# Patient Record
Sex: FEMALE | Race: WHITE | NOT HISPANIC OR LATINO | Employment: UNEMPLOYED | URBAN - METROPOLITAN AREA
[De-identification: names, ages, dates, MRNs, and addresses within clinical notes are randomized per-mention and may not be internally consistent; named-entity substitution may affect disease eponyms.]

---

## 2019-07-30 ENCOUNTER — OFFICE VISIT (OUTPATIENT)
Dept: URGENT CARE | Age: 64
End: 2019-07-30
Payer: COMMERCIAL

## 2019-07-30 VITALS
HEIGHT: 60 IN | RESPIRATION RATE: 20 BRPM | DIASTOLIC BLOOD PRESSURE: 65 MMHG | TEMPERATURE: 99.2 F | OXYGEN SATURATION: 96 % | BODY MASS INDEX: 25.6 KG/M2 | HEART RATE: 87 BPM | WEIGHT: 130.4 LBS | SYSTOLIC BLOOD PRESSURE: 146 MMHG

## 2019-07-30 DIAGNOSIS — K62.89 RECTAL PAIN: ICD-10-CM

## 2019-07-30 DIAGNOSIS — K59.00 CONSTIPATION, UNSPECIFIED CONSTIPATION TYPE: Primary | ICD-10-CM

## 2019-07-30 PROCEDURE — G0463 HOSPITAL OUTPT CLINIC VISIT: HCPCS | Performed by: FAMILY MEDICINE

## 2019-07-30 PROCEDURE — 99203 OFFICE O/P NEW LOW 30 MIN: CPT | Performed by: FAMILY MEDICINE

## 2019-07-30 RX ORDER — TOPIRAMATE 50 MG/1
TABLET, FILM COATED ORAL
COMMUNITY
Start: 2019-01-03

## 2019-07-30 RX ORDER — OXYBUTYNIN CHLORIDE 15 MG/1
15 TABLET, EXTENDED RELEASE ORAL
COMMUNITY

## 2019-07-30 RX ORDER — ALBUTEROL SULFATE 0.63 MG/3ML
1 SOLUTION RESPIRATORY (INHALATION) 2 TIMES DAILY PRN
COMMUNITY

## 2019-07-30 RX ORDER — TRAMADOL HYDROCHLORIDE 50 MG/1
TABLET ORAL
COMMUNITY
Start: 2019-07-10

## 2019-07-30 RX ORDER — TOLTERODINE 2 MG/1
2 CAPSULE, EXTENDED RELEASE ORAL
COMMUNITY

## 2019-07-30 RX ORDER — REPAGLINIDE 1 MG/1
TABLET ORAL
COMMUNITY
Start: 2019-07-07

## 2019-07-30 RX ORDER — BLOOD-GLUCOSE METER
EACH MISCELLANEOUS
COMMUNITY
Start: 2019-06-07

## 2019-07-30 RX ORDER — DULOXETIN HYDROCHLORIDE 60 MG/1
CAPSULE, DELAYED RELEASE ORAL
COMMUNITY
Start: 2019-07-07

## 2019-07-30 RX ORDER — POLYETHYLENE GLYCOL 3350 17 G/17G
17 POWDER, FOR SOLUTION ORAL DAILY
Qty: 14 EACH | Refills: 0 | Status: SHIPPED | OUTPATIENT
Start: 2019-07-30

## 2019-07-30 RX ORDER — ALBUTEROL SULFATE 2.5 MG/3ML
SOLUTION RESPIRATORY (INHALATION)
COMMUNITY
Start: 2019-06-04

## 2019-07-30 RX ORDER — IBUPROFEN 600 MG/1
600 TABLET ORAL EVERY 6 HOURS PRN
COMMUNITY
Start: 2019-04-11 | End: 2020-04-10

## 2019-07-30 RX ORDER — LORATADINE 10 MG/1
10 TABLET ORAL
COMMUNITY

## 2019-07-30 RX ORDER — REPAGLINIDE 1 MG/1
TABLET ORAL
COMMUNITY
Start: 2019-07-11

## 2019-07-30 RX ORDER — ATORVASTATIN CALCIUM 10 MG/1
TABLET, FILM COATED ORAL
COMMUNITY
Start: 2019-07-28

## 2019-07-30 RX ORDER — GABAPENTIN 600 MG/1
600 TABLET ORAL 3 TIMES DAILY
COMMUNITY
Start: 2019-05-28

## 2019-07-30 RX ORDER — OMEPRAZOLE 40 MG/1
CAPSULE, DELAYED RELEASE ORAL
COMMUNITY
Start: 2019-07-07

## 2019-07-30 RX ORDER — ATORVASTATIN CALCIUM 20 MG/1
20 TABLET, FILM COATED ORAL
COMMUNITY
Start: 2019-07-11 | End: 2020-07-10

## 2019-07-30 RX ORDER — ATORVASTATIN CALCIUM 20 MG/1
TABLET, FILM COATED ORAL
COMMUNITY
Start: 2019-07-11

## 2019-07-30 RX ORDER — GABAPENTIN 600 MG/1
TABLET ORAL
COMMUNITY
Start: 2019-06-07

## 2019-07-30 RX ORDER — FEXOFENADINE HCL 180 MG/1
TABLET ORAL
COMMUNITY

## 2019-07-30 RX ORDER — BACLOFEN 10 MG/1
TABLET ORAL
COMMUNITY
Start: 2019-06-06

## 2019-07-30 NOTE — PROGRESS NOTES
[employer]        NAME: Damaris Morel is a 62 y o  female  : 1955    MRN: [id number]  DATE: 2019  TIME: 11:57 AM    Assessment and Plan   Constipation, unspecified constipation type [K59 00]  1  Constipation, unspecified constipation type  polyethylene glycol (MIRALAX) 17 g packet   2  Rectal pain  Lidocaine-Glycerin (PREPARATION H) 5-14 4 % CREA       Patient Instructions     Apply preparation H as directed  MiraLax as directed  Increase water and fiber intake  Follow up with PCP/GI in 3-5 days  Proceed to  ER if symptoms worsen  Chief Complaint     Chief Complaint   Patient presents with    Rectal Pain     Pt reports she may have a tear between her rectum and vagina  C/O burning and pain when wiping with blood  Has been applying OTC cream  Ongoing for several weeks  States she has constipation and has been straining with BMs  History of Present Illness       Patient is a 67-year-old female who presents with intermittent rectal pain x several weeks  She reports history of chronic constipation  Has had 2 bowel movements in 2 weeks  Last colonoscopy was 4-5 years ago at which time she had removal of 2 polyps  Per patient she is supposed to follow up with GI for another colonoscopy right around this time  According to patient, she strains a lot while trying to have a BM and now has a lot of pain in the rectal area  Denies any bleeding or abdominal pain  Patient admitted to not eating much fiber or drinking a lot of water  Review of Systems   Review of Systems   Constitutional: Negative for fever  Respiratory: Negative  Cardiovascular: Negative  Gastrointestinal: Positive for constipation and rectal pain  Genitourinary: Negative  Negative for dysuria and vaginal discharge  Musculoskeletal: Negative  Skin: Negative for rash  Neurological: Negative            Current Medications       Current Outpatient Medications:     albuterol (2 5 mg/3 mL) 0 083 % nebulizer solution, , Disp: , Rfl:     albuterol (ACCUNEB) 0 63 MG/3ML nebulizer solution, Inhale 1 ampule 2 (two) times a day as needed, Disp: , Rfl:     ASPIRIN 81 PO, Take 81 mg by mouth daily, Disp: , Rfl:     atorvastatin (LIPITOR) 10 mg tablet, , Disp: , Rfl:     atorvastatin (LIPITOR) 20 mg tablet, Take 20 mg by mouth, Disp: , Rfl:     atorvastatin (LIPITOR) 20 mg tablet, , Disp: , Rfl:     baclofen 10 mg tablet, , Disp: , Rfl:     Blood Glucose Monitoring Suppl (ACCU-CHEK SALVADOR PLUS) w/Device KIT, , Disp: , Rfl:     Budesonide-Formoterol Fumarate (SYMBICORT IN), Inhale 1 Dose 2 (two) times a day, Disp: , Rfl:     DULoxetine (CYMBALTA) 60 mg delayed release capsule, , Disp: , Rfl:     fexofenadine (ALLEGRA) 180 MG tablet, Take by mouth, Disp: , Rfl:     gabapentin (NEURONTIN) 600 MG tablet, Take 600 mg by mouth Three times a day, Disp: , Rfl:     hydrocortisone 2 5 % cream, apply to affected area twice a day AS NEEDED, Disp: , Rfl:     ibuprofen (MOTRIN) 600 mg tablet, Take 600 mg by mouth every 6 (six) hours as needed, Disp: , Rfl:     insulin glargine (LANTUS SOLOSTAR) 100 units/mL injection pen, Indications: type 2 diabetes mellitus   Inject 5 units daily, Disp: , Rfl:     loratadine (CLARITIN) 10 mg tablet, Take 10 mg by mouth, Disp: , Rfl:     metFORMIN (GLUCOPHAGE) 1000 MG tablet, Take 1,000 mg by mouth, Disp: , Rfl:     metFORMIN (GLUCOPHAGE) 1000 MG tablet, , Disp: , Rfl:     omeprazole (PriLOSEC) 40 MG capsule, , Disp: , Rfl:     oxybutynin (DITROPAN XL) 15 MG 24 hr tablet, Take 15 mg by mouth, Disp: , Rfl:     repaglinide (PRANDIN) 1 mg tablet, Take 2 tablets with lunch and 1 tablet with light evening meal , Disp: , Rfl:     repaglinide (PRANDIN) 1 mg tablet, , Disp: , Rfl:     tolterodine (DETROL LA) 2 mg 24 hr capsule, Take 2 mg by mouth, Disp: , Rfl:     topiramate (TOPAMAX) 50 MG tablet, take 1 tablet by mouth twice a day, Disp: , Rfl:     traMADol (ULTRAM) 50 mg tablet, , Disp: , Rfl:     gabapentin (NEURONTIN) 600 MG tablet, , Disp: , Rfl:     Lidocaine-Glycerin (PREPARATION H) 5-14 4 % CREA, Apply prn up to 4X/day, Disp: 1 Tube, Rfl: 0    polyethylene glycol (MIRALAX) 17 g packet, Take 17 g by mouth daily, Disp: 14 each, Rfl: 0    Current Allergies     Allergies as of 07/30/2019 - Reviewed 07/30/2019   Allergen Reaction Noted    Lanolin  04/20/2018    Pollen extract  04/20/2018    Strawberry extract Itching 04/20/2018            The following portions of the patient's history were reviewed and updated as appropriate: allergies, current medications, past family history, past medical history, past social history, past surgical history and problem list      Past Medical History:   Diagnosis Date    Arthritis     Diabetes mellitus ([address] )     Fibromyalgia     GERD (gastroesophageal reflux disease)     Hypercholesterolemia        Past Surgical History:   Procedure Laterality Date    CARPAL TUNNEL RELEASE      TUBAL LIGATION         History reviewed  No pertinent family history  Medications have been verified  Objective   /65   Pulse 87   Temp 99 2 °F (37 3 °C)   Resp 20   Ht 4' 11 5" (1 511 m)   Wt 59 1 kg (130 lb 6 4 oz)   SpO2 96%   BMI 25 90 kg/m²        Physical Exam     Physical Exam   Constitutional: She is oriented to person, place, and time  She appears well-developed and well-nourished  No distress  HENT:   Head: Normocephalic and atraumatic  Eyes: Conjunctivae and EOM are normal    Neck: Normal range of motion  Neck supple  Cardiovascular: Normal rate  Pulmonary/Chest: Effort normal and breath sounds normal  No respiratory distress  She has no wheezes  She has no rales  She exhibits no tenderness  Abdominal: Soft  Bowel sounds are normal  She exhibits no distension and no mass  There is no tenderness  There is no rebound and no guarding     Genitourinary:   Genitourinary Comments: Multiple small outpouching of the rectal mucosa with mild erythema, area tender to palpation but no edema noted  Unable to appreciate any anal fissures  Musculoskeletal: Normal range of motion  She exhibits no edema, tenderness or deformity  Lymphadenopathy:     She has no cervical adenopathy  Neurological: She is alert and oriented to person, place, and time  Skin: Skin is warm and dry  No rash noted  No erythema  No pallor  Psychiatric: She has a normal mood and affect  Nursing note and vitals reviewed

## 2019-08-14 ENCOUNTER — OFFICE VISIT (OUTPATIENT)
Dept: URGENT CARE | Age: 64
End: 2019-08-14
Payer: COMMERCIAL

## 2019-08-14 VITALS
BODY MASS INDEX: 25.13 KG/M2 | WEIGHT: 128 LBS | SYSTOLIC BLOOD PRESSURE: 154 MMHG | TEMPERATURE: 97.6 F | OXYGEN SATURATION: 98 % | HEIGHT: 60 IN | RESPIRATION RATE: 18 BRPM | DIASTOLIC BLOOD PRESSURE: 68 MMHG | HEART RATE: 99 BPM

## 2019-08-14 DIAGNOSIS — S69.91XA INJURY OF RIGHT THUMB, INITIAL ENCOUNTER: Primary | ICD-10-CM

## 2019-08-14 DIAGNOSIS — L03.011 PARONYCHIA OF RIGHT THUMB: ICD-10-CM

## 2019-08-14 PROCEDURE — 99213 OFFICE O/P EST LOW 20 MIN: CPT | Performed by: NURSE PRACTITIONER

## 2019-08-14 PROCEDURE — G0463 HOSPITAL OUTPT CLINIC VISIT: HCPCS | Performed by: NURSE PRACTITIONER

## 2019-08-14 RX ORDER — LANCETS
EACH MISCELLANEOUS
Refills: 0 | COMMUNITY
Start: 2019-06-07

## 2019-08-14 RX ORDER — CEPHALEXIN 500 MG/1
500 CAPSULE ORAL EVERY 8 HOURS SCHEDULED
Qty: 21 CAPSULE | Refills: 0 | Status: SHIPPED | OUTPATIENT
Start: 2019-08-14 | End: 2019-08-21

## 2019-08-14 RX ORDER — BLOOD SUGAR DIAGNOSTIC
STRIP MISCELLANEOUS
Refills: 0 | COMMUNITY
Start: 2019-06-07

## 2019-08-14 NOTE — PATIENT INSTRUCTIONS
Take meds as directed  Take antibiotic as directed   Warm soaks with epson salt   If worse go to the ER  Tylenol and motrin for fever or pain       Paronychia   WHAT YOU NEED TO KNOW:   Paronychia is an infection of your nail fold caused by bacteria or a fungus  The nail fold is the skin around your nail  Paronychia may happen suddenly and last for 6 weeks or longer  You may have paronychia on more than 1 finger or toe  DISCHARGE INSTRUCTIONS:   Medicines:   · Td vaccine  is a booster shot used to help prevent tetanus and diphtheria  The Td booster may be given to adolescents and adults every 10 years or for certain wounds and injuries  · Antibiotics: This medicine will help fight or prevent an infection  It may be given as a pill, cream, or ointment  · Steroids: This medicine will help decrease inflammation  It may be given as a pill, cream, or ointment  · Antifungal medicine: This medicine helps kill fungus that may be causing your infection  It may be given as a cream or ointment  · NSAIDs:  These medicines decrease pain and swelling  NSAIDs are available without a doctor's order  Ask your healthcare provider which medicine is right for you  Ask how much to take and when to take it  Take as directed  NSAIDs can cause stomach bleeding and kidney problems if not taken correctly  · Take your medicine as directed  Contact your healthcare provider if you think your medicine is not helping or if you have side effects  Tell him of her if you are allergic to any medicine  Keep a list of the medicines, vitamins, and herbs you take  Include the amounts, and when and why you take them  Bring the list or the pill bottles to follow-up visits  Carry your medicine list with you in case of an emergency  Follow up with your healthcare provider as directed:  Write down your questions so you remember to ask them during your visits     Self-care:   · Soak your nail:  Soak your nail in a mixture of equal parts vinegar and water 3 or 4 times each day  This will help decrease inflammation  · Apply a warm compress:  Soak a washcloth in warm water and place it on your nail  This will help decrease inflammation  · Elevate:  Raise your nail above the level of your heart as often as you can  This will help decrease swelling and pain  Prop your nail on pillows or blankets to keep it elevated comfortably  · Use lotion:  Apply lotion after you wash your hands  This will prevent your skin from becoming too dry  Prevent paronychia:   · Avoid chemicals and allergens that may harm your skin and nails  This includes soaps, laundry detergents, and nail products  · Keep your nails clean and dry  Avoid soaking your nails in water  Use cotton-lined rubber gloves or wear 2 rubber gloves if you work with food or water  The gloves will help protect your nail folds  · Keep your nails short  Do not bite your nails, pick at your hangnails, suck your fingers, or wear fake nails  Bring your own nail tools when you go to the nail salon  Contact your healthcare provider if:   · Your nail becomes loose, deformed, or falls off  · You have a large abscess on your nail  · You have questions or concerns about your condition or care  Return to the emergency department if:   · You have severe nail pain  · The inflammation spreads to your hand or arm  © 2017 [employer] Information is for End User's use only and may not be sold, redistributed or otherwise used for commercial purposes  All illustrations and images included in CareNotes® are the copyrighted property of [employer] M [employer]  or [employer]  The above information is an  only  It is not intended as medical advice for individual conditions or treatments  Talk to your doctor, nurse or pharmacist before following any medical regimen to see if it is safe and effective for you

## 2019-08-14 NOTE — PROGRESS NOTES
NAME: Nnamdi Berkowitz is a 62 y o  female  : 1955    MRN: [id number]      Assessment and Plan   Injury of right thumb, initial encounter Rob Sandoval  1  Injury of right thumb, initial encounter  CANCELED: XR thumb right first digit-min 2v   2  Paronychia of right thumb  cephalexin (KEFLEX) 500 mg capsule       Sanya Marti was seen today for thumb pain  Diagnoses and all orders for this visit:    Injury of right thumb, initial encounter  -     Cancel: XR thumb right first digit-min 2v; Future    Paronychia of right thumb  -     cephalexin (KEFLEX) 500 mg capsule; Take 1 capsule (500 mg total) by mouth every 8 (eight) hours for 7 days        Patient Instructions   Patient Instructions   Take meds as directed  Take antibiotic as directed   Warm soaks with epson salt   If worse go to the ER  Tylenol and motrin for fever or pain       Paronychia   WHAT YOU NEED TO KNOW:   Paronychia is an infection of your nail fold caused by bacteria or a fungus  The nail fold is the skin around your nail  Paronychia may happen suddenly and last for 6 weeks or longer  You may have paronychia on more than 1 finger or toe  DISCHARGE INSTRUCTIONS:   Medicines:   · Td vaccine  is a booster shot used to help prevent tetanus and diphtheria  The Td booster may be given to adolescents and adults every 10 years or for certain wounds and injuries  · Antibiotics: This medicine will help fight or prevent an infection  It may be given as a pill, cream, or ointment  · Steroids: This medicine will help decrease inflammation  It may be given as a pill, cream, or ointment  · Antifungal medicine: This medicine helps kill fungus that may be causing your infection  It may be given as a cream or ointment  · NSAIDs:  These medicines decrease pain and swelling  NSAIDs are available without a doctor's order  Ask your healthcare provider which medicine is right for you  Ask how much to take and when to take it  Take as directed  NSAIDs can cause stomach bleeding and kidney problems if not taken correctly  · Take your medicine as directed  Contact your healthcare provider if you think your medicine is not helping or if you have side effects  Tell him of her if you are allergic to any medicine  Keep a list of the medicines, vitamins, and herbs you take  Include the amounts, and when and why you take them  Bring the list or the pill bottles to follow-up visits  Carry your medicine list with you in case of an emergency  Follow up with your healthcare provider as directed:  Write down your questions so you remember to ask them during your visits  Self-care:   · Soak your nail:  Soak your nail in a mixture of equal parts vinegar and water 3 or 4 times each day  This will help decrease inflammation  · Apply a warm compress:  Soak a washcloth in warm water and place it on your nail  This will help decrease inflammation  · Elevate:  Raise your nail above the level of your heart as often as you can  This will help decrease swelling and pain  Prop your nail on pillows or blankets to keep it elevated comfortably  · Use lotion:  Apply lotion after you wash your hands  This will prevent your skin from becoming too dry  Prevent paronychia:   · Avoid chemicals and allergens that may harm your skin and nails  This includes soaps, laundry detergents, and nail products  · Keep your nails clean and dry  Avoid soaking your nails in water  Use cotton-lined rubber gloves or wear 2 rubber gloves if you work with food or water  The gloves will help protect your nail folds  · Keep your nails short  Do not bite your nails, pick at your hangnails, suck your fingers, or wear fake nails  Bring your own nail tools when you go to the nail salon  Contact your healthcare provider if:   · Your nail becomes loose, deformed, or falls off  · You have a large abscess on your nail      · You have questions or concerns about your condition or care   Return to the emergency department if:   · You have severe nail pain  · The inflammation spreads to your hand or arm  © 2017 [employer] Information is for End User's use only and may not be sold, redistributed or otherwise used for commercial purposes  All illustrations and images included in CareNotes® are the copyrighted property of [employer] Inc  or [employer]  The above information is an  only  It is not intended as medical advice for individual conditions or treatments  Talk to your doctor, nurse or pharmacist before following any medical regimen to see if it is safe and effective for you  Proceed to ER if symptoms worsen  Chief Complaint     Chief Complaint   Patient presents with    Thumb Pain     Pt states she injured her right thumb 3 weeks ago but could not recall how  Pt states the pain intensified about a week ago (sharp)  Pt has redness, swelling, and pus discharge around nailbed  Denies fever  History of Present Illness     18-year-old female here today with right thumb pain and possible infection  She states she injured her right thumb about 3 weeks ago and for got about it and knows that it was swelling since yesterday  She was picking at it and then burned a needle and popped whole in the wound and pus drained from the area  Finger is tender to touch however has full range of motion no fevers and no red streaks coming from the wound  Patient has good capillary refill  Review of Systems   Review of Systems   Skin: Positive for wound (Right thumb)           Current Medications       Current Outpatient Medications:     ACCU-CHEK SALVADOR PLUS test strip, TEST BLOOD SUGARS 3 TIMES DAILY, Disp: , Rfl: 0    ACCU-CHEK SOFTCLIX LANCETS lancets, USE TO TEST BLOOD SUGARS 3 TIMES DAILY, Disp: , Rfl: 0    albuterol (2 5 mg/3 mL) 0 083 % nebulizer solution, , Disp: , Rfl:     albuterol (ACCUNEB) 0 63 MG/3ML nebulizer solution, Inhale 1 ampule 2 (two) times a day as needed, Disp: , Rfl:     ASPIRIN 81 PO, Take 81 mg by mouth daily, Disp: , Rfl:     atorvastatin (LIPITOR) 10 mg tablet, , Disp: , Rfl:     atorvastatin (LIPITOR) 20 mg tablet, Take 20 mg by mouth, Disp: , Rfl:     atorvastatin (LIPITOR) 20 mg tablet, , Disp: , Rfl:     baclofen 10 mg tablet, , Disp: , Rfl:     Blood Glucose Monitoring Suppl (ACCU-CHEK SALVADOR PLUS) w/Device KIT, , Disp: , Rfl:     Budesonide-Formoterol Fumarate (SYMBICORT IN), Inhale 1 Dose 2 (two) times a day, Disp: , Rfl:     cephalexin (KEFLEX) 500 mg capsule, Take 1 capsule (500 mg total) by mouth every 8 (eight) hours for 7 days, Disp: 21 capsule, Rfl: 0    DULoxetine (CYMBALTA) 60 mg delayed release capsule, , Disp: , Rfl:     fexofenadine (ALLEGRA) 180 MG tablet, Take by mouth, Disp: , Rfl:     gabapentin (NEURONTIN) 600 MG tablet, Take 600 mg by mouth Three times a day, Disp: , Rfl:     gabapentin (NEURONTIN) 600 MG tablet, , Disp: , Rfl:     hydrocortisone 2 5 % cream, apply to affected area twice a day AS NEEDED, Disp: , Rfl:     ibuprofen (MOTRIN) 600 mg tablet, Take 600 mg by mouth every 6 (six) hours as needed, Disp: , Rfl:     insulin glargine (LANTUS SOLOSTAR) 100 units/mL injection pen, Indications: type 2 diabetes mellitus   Inject 5 units daily, Disp: , Rfl:     Lidocaine-Glycerin (PREPARATION H) 5-14 4 % CREA, Apply prn up to 4X/day, Disp: 1 Tube, Rfl: 0    loratadine (CLARITIN) 10 mg tablet, Take 10 mg by mouth, Disp: , Rfl:     metFORMIN (GLUCOPHAGE) 1000 MG tablet, Take 1,000 mg by mouth, Disp: , Rfl:     metFORMIN (GLUCOPHAGE) 1000 MG tablet, , Disp: , Rfl:     omeprazole (PriLOSEC) 40 MG capsule, , Disp: , Rfl:     oxybutynin (DITROPAN XL) 15 MG 24 hr tablet, Take 15 mg by mouth, Disp: , Rfl:     polyethylene glycol (MIRALAX) 17 g packet, Take 17 g by mouth daily, Disp: 14 each, Rfl: 0    repaglinide (PRANDIN) 1 mg tablet, Take 2 tablets with lunch and 1 tablet with light evening meal , Disp: , Rfl:     repaglinide (PRANDIN) 1 mg tablet, , Disp: , Rfl:     tolterodine (DETROL LA) 2 mg 24 hr capsule, Take 2 mg by mouth, Disp: , Rfl:     topiramate (TOPAMAX) 50 MG tablet, take 1 tablet by mouth twice a day, Disp: , Rfl:     traMADol (ULTRAM) 50 mg tablet, , Disp: , Rfl:     Current Allergies     Allergies as of 08/14/2019 - Reviewed 08/14/2019   Allergen Reaction Noted    Lanolin  04/20/2018    Pollen extract  04/20/2018    Strawberry extract Itching 04/20/2018              Past Medical History:   Diagnosis Date    Arthritis     Diabetes mellitus ([address] )     Fibromyalgia     GERD (gastroesophageal reflux disease)     Hypercholesterolemia        Past Surgical History:   Procedure Laterality Date    CARPAL TUNNEL RELEASE      TUBAL LIGATION         History reviewed  No pertinent family history  Medications have been verified  The following portions of the patient's history were reviewed and updated as appropriate: allergies, current medications, past family history, past medical history, past social history, past surgical history and problem list     Objective   /68   Pulse 99   Temp 97 6 °F (36 4 °C)   Resp 18   Ht 4' 11 5" (1 511 m)   Wt 58 1 kg (128 lb)   SpO2 98%   BMI 25 42 kg/m²      Physical Exam     Physical Exam   Constitutional: She appears well-developed and well-nourished  Cardiovascular: Normal rate, regular rhythm and normal heart sounds  Pulmonary/Chest: Effort normal and breath sounds normal    Musculoskeletal:        Right hand: She exhibits tenderness  She exhibits normal range of motion and no swelling  Normal sensation noted  Normal strength noted  She exhibits no finger abduction, no thumb/finger opposition and no wrist extension trouble  Hands:  Skin: Skin is warm  Capillary refill takes less than 2 seconds         DEO Durant

## 2023-05-02 ENCOUNTER — OFFICE VISIT (OUTPATIENT)
Dept: PHYSICAL THERAPY | Facility: REHABILITATION | Age: 68
End: 2023-05-02

## 2023-05-02 DIAGNOSIS — R26.9 GAIT ABNORMALITY: Primary | ICD-10-CM

## 2023-05-02 NOTE — PROGRESS NOTES
"Daily Note     Today's date: 2023  Patient name: Angela Fernández  : 1955  MRN: [id number]  Referring provider: DEO Lu  Dx:   Encounter Diagnosis     ICD-10-CM    1  Gait abnormality  R26 9                      Subjective: pt reports her legs have been feelign worse since last visit  She denied change in activity and falls, but noted having \"a few close calls  \"      Objective: See treatment diary below      Assessment: Tolerated treatment well  Good tolerance with addition of exercises with no adverse reactions  Pt required short resting periods during exercises due to LE fatigue  Patient demonstrated fatigue post treatment, exhibited good technique with therapeutic exercises and would benefit from continued PT      Plan: Continue per plan of care  Progress treatment as tolerated  Precautions: DM, GERD, **FALL RISK**    Daily Treatment Diary    Date            FOTO IE            Re-Eval IE               Manuals                                                        Neuro Re-Ed     FT/EO balance Floor CG Floor CG  30\"x2           FA/EC balance Floor CG Floor CG  30\"x2           Part-tandem balance Floor CG Floor CG   30\"x2                                                               Ther Ex    Mini squats             HR/TR c UE A x10 ea c UE A x10 ea           Standing march c UE A  x10 b/l c UE A x10 ea           Standing hip abd, ext c UE A  x10 b/l c UE A x10 ea           Side stepping at HR                                                    Ther Activity    Bike   5 min           Sit to stand                          Gait Training                              Modalities                              Access Code: [email or web address]  URL: [email or web address]  Date: 2023  Prepared by:  Thuan Sheth    Exercises  - Heel Toe Raises with Counter Support  - 1 x daily - 7 x weekly - 1 sets - 10 reps  - Heel Raises with Counter Support  - 1 x daily - 7 x weekly - " 1 sets - 10 reps  - Standing March with Counter Support  - 1 x daily - 7 x weekly - 1 sets - 10 reps  - Standing Hip Abduction with Unilateral Counter Support  - 1 x daily - 7 x weekly - 1 sets - 10 reps  - Standing Hip Extension with Unilateral Counter Support  - 1 x daily - 7 x weekly - 1 sets - 10 reps

## 2023-05-16 ENCOUNTER — OFFICE VISIT (OUTPATIENT)
Dept: PHYSICAL THERAPY | Facility: REHABILITATION | Age: 68
End: 2023-05-16

## 2023-05-16 DIAGNOSIS — R26.9 GAIT ABNORMALITY: Primary | ICD-10-CM

## 2023-05-16 NOTE — PROGRESS NOTES
"Daily Note     Today's date: 2023  Patient name: Lorenzo San  : 1955  MRN: [id number]  Referring provider: DEO Roche  Dx:   Encounter Diagnosis     ICD-10-CM    1  Gait abnormality  R26 9                      Subjective: Pt comes to therapy denying changes since last session  Objective: See treatment diary below      Assessment: Tolerated treatment well  Challenged with program  Patient would benefit from continued PT      Plan: Progress treatment as tolerated  Precautions: DM, GERD, **FALL RISK**    Daily Treatment Diary    Date           FOTO IE            Re-Eval IE               Manuals                                                        Neuro Re-Ed     FT/EO balance Floor CG Floor CG  30\"x2 airex CG/CS 30\"x3          FA/EC balance Floor CG Floor CG  30\"x2 airex CG/CS 30\"x3          Part-tandem balance Floor CG Floor CG   30\"x2 floor CG/CS 30\"x2 B          H/L TA hip abd iso   red 5\"x20          H/L TA hip add iso   5\"x20          Bridges    5\" 2x10                       Ther Ex    Mini squats   2x10          HR/TR c UE A x10 ea c UE A x10 ea c UE A x20 ea          Standing march c UE A  x10 b/l c UE A x10 ea c UE A x20 ea          Standing hip abd, ext c UE A  x10 b/l c UE A x10 ea c UE A x20 ea          Side step/tandem on beam   4 laps ea CG                                                 Ther Activity    Bike   5 min 5 min          Sit to stand   nv                       Gait Training                              Modalities                              Access Code: [email or web address]  URL: [email or web address]  Date: 2023  Prepared by:  Puma Bartlett    Exercises  - Heel Toe Raises with Counter Support  - 1 x daily - 7 x weekly - 1 sets - 10 reps  - Heel Raises with Counter Support  - 1 x daily - 7 x weekly - 1 sets - 10 reps  - Standing March with Counter Support  - 1 x daily - 7 x weekly - 1 sets - 10 reps  - Standing Hip Abduction " with Unilateral Counter Support  - 1 x daily - 7 x weekly - 1 sets - 10 reps  - Standing Hip Extension with Unilateral Counter Support  - 1 x daily - 7 x weekly - 1 sets - 10 reps

## 2023-05-30 ENCOUNTER — OFFICE VISIT (OUTPATIENT)
Dept: PHYSICAL THERAPY | Facility: REHABILITATION | Age: 68
End: 2023-05-30

## 2023-05-30 DIAGNOSIS — R26.9 GAIT ABNORMALITY: Primary | ICD-10-CM

## 2023-05-30 NOTE — PROGRESS NOTES
"Daily Note     Today's date: 2023  Patient name: Milton Stockton  : 1955  MRN: [id number]  Referring provider: DEO Singh  Dx:   Encounter Diagnosis     ICD-10-CM    1  Gait abnormality  R26 9                      Subjective: Patient present to PT using high RW backwards  She states she does not like to use it the other way because the wheels do not turn  Denies any recent falls  Decreased hip flexion and stride length noted as well as WBOS  Objective: See treatment diary below      Assessment: Tolerated treatment well  She was able to progress resistance with standing hip series and marches  Cues for technique with some carryover  She will utilize momentum at times, discussed performing exercises at slower pace to allow increased muscle recruitment  She is challenged with balance activities on foam relying on UE support for no LOB  CGA throughout for safety  Patient demonstrated fatigue post treatment and would benefit from continued PT      Plan: Continue per plan of care        Precautions: DM, GERD, **FALL RISK**    Daily Treatment Diary    Date          FOTO IE            Re-Eval IE               Manuals                                                        Neuro Re-Ed     FT/EO balance Floor CG Floor CG  30\"x2 airex CG/CS 30\"x3 airex  CG/CS  30\"x3         FA/EC balance Floor CG Floor CG  30\"x2 airex CG/CS 30\"x3 airex  CG/CS  30\"x3         Part-tandem balance Floor CG Floor CG   30\"x2 floor CG/CS 30\"x2 B Floor CG/CS 30\"x2 B         H/L TA hip abd iso   red 5\"x20 Red   5\"x20         H/L TA hip add iso   5\"x20 5\"x20         Bridges    5\" 2x10 5\"2x10                      Ther Ex    Mini squats   2x10 2x10         HR/TR c UE A x10 ea c UE A x10 ea c UE A x20 ea c UE A  x20 ea         Standing march c UE A  x10 b/l c UE A x10 ea c UE A x20 ea 1# c UE A  x20 ea         Standing hip abd, ext c UE A  x10 b/l c UE A x10 ea c UE A x20 ea 1# c UE A x20 ea        " Side step/tandem on beam   4 laps ea CG 4 laps ea CG                                                Ther Activity    Bike   5 min 5 min 5 min         Sit to stand   nv 5x                      Gait Training                              Modalities                              Access Code: [email or web address]  URL: [email or web address]  Date: 05/02/2023  Prepared by:  Yovani Mora    Exercises  - Heel Toe Raises with Counter Support  - 1 x daily - 7 x weekly - 1 sets - 10 reps  - Heel Raises with Counter Support  - 1 x daily - 7 x weekly - 1 sets - 10 reps  - Standing March with Counter Support  - 1 x daily - 7 x weekly - 1 sets - 10 reps  - Standing Hip Abduction with Unilateral Counter Support  - 1 x daily - 7 x weekly - 1 sets - 10 reps  - Standing Hip Extension with Unilateral Counter Support  - 1 x daily - 7 x weekly - 1 sets - 10 reps

## 2023-06-13 ENCOUNTER — OFFICE VISIT (OUTPATIENT)
Dept: PHYSICAL THERAPY | Facility: REHABILITATION | Age: 68
End: 2023-06-13
Payer: COMMERCIAL

## 2023-06-13 DIAGNOSIS — R26.9 GAIT ABNORMALITY: Primary | ICD-10-CM

## 2023-06-13 PROCEDURE — 97110 THERAPEUTIC EXERCISES: CPT

## 2023-06-13 PROCEDURE — 97112 NEUROMUSCULAR REEDUCATION: CPT

## 2023-06-13 NOTE — PROGRESS NOTES
"Daily Note     Today's date: 2023  Patient name: Raymond Tidwell  : 1955  MRN: [id number]  Referring provider: DEO Rivas  Dx:   Encounter Diagnosis     ICD-10-CM    1  Gait abnormality  R26 9                      Subjective: pt presents to therapy with SPC reporting no LOB and/or falls within the past two weeks since last visit  She noted feeling steadier on her feet and feels like she doesn't need the cane  She further noted ability to walk longer distances without difficulty  Objective: See treatment diary below      Assessment: Tolerated treatment well and without complaints  Good response with exercises  Some cues required to ensure good carry over with balance exercises  Patient demonstrated fatigue post treatment, exhibited good technique with therapeutic exercises and would benefit from continued PT      Plan: Potential discharge next visit       Precautions: DM, GERD, **FALL RISK**    Daily Treatment Diary    Date         FOTO IE            Re-Eval IE               Manuals                                                        Neuro Re-Ed     FT/EO balance Floor CG Floor CG  30\"x2 airex CG/CS 30\"x3 airex  CG/CS  30\"x3 airex  CG/CS  30\"x3        FA/EC balance Floor CG Floor CG  30\"x2 airex CG/CS 30\"x3 airex  CG/CS  30\"x3 airex  CG/CS  30\"x3        Part-tandem balance Floor CG Floor CG   30\"x2 floor CG/CS 30\"x2 B Floor CG/CS 30\"x2 B Floor CG/CS 30\"x2 B        H/L TA hip abd iso   red 5\"x20 Red   5\"x20 Pink 5\"x20        H/L TA hip add iso   5\"x20 5\"x20 5\"x20        Bridges    5\" 2x10 5\"2x10 5\"2x10                     Ther Ex    Mini squats   2x10 2x10 2x10        HR/TR c UE A x10 ea c UE A x10 ea c UE A x20 ea c UE A  x20 ea c UE A  x20 ea        Standing march c UE A  x10 b/l c UE A x10 ea c UE A x20 ea 1# c UE A  x20 ea 1# c UE A  x20 ea        Standing hip abd, ext c UE A  x10 b/l c UE A x10 ea c UE A x20 ea 1# c UE A x20 ea  1# c UE A  x20 ea      " Side step/tandem on beam   4 laps ea CG 4 laps ea CG 4 laps ea CG                                               Ther Activity    Bike   5 min 5 min 5 min 5 min        Sit to stand   nv 5x                      Gait Training                              Modalities                              Access Code: [email or web address]  URL: [email or web address]  Date: 05/02/2023  Prepared by:  Pedrito Armijo    Exercises  - Heel Toe Raises with Counter Support  - 1 x daily - 7 x weekly - 1 sets - 10 reps  - Heel Raises with Counter Support  - 1 x daily - 7 x weekly - 1 sets - 10 reps  - Standing March with Counter Support  - 1 x daily - 7 x weekly - 1 sets - 10 reps  - Standing Hip Abduction with Unilateral Counter Support  - 1 x daily - 7 x weekly - 1 sets - 10 reps  - Standing Hip Extension with Unilateral Counter Support  - 1 x daily - 7 x weekly - 1 sets - 10 reps